# Patient Record
Sex: FEMALE | Race: OTHER | URBAN - METROPOLITAN AREA
[De-identification: names, ages, dates, MRNs, and addresses within clinical notes are randomized per-mention and may not be internally consistent; named-entity substitution may affect disease eponyms.]

---

## 2017-07-31 ENCOUNTER — APPOINTMENT (RX ONLY)
Dept: URBAN - METROPOLITAN AREA CLINIC 76 | Facility: CLINIC | Age: 47
Setting detail: DERMATOLOGY
End: 2017-07-31

## 2017-07-31 VITALS — HEIGHT: 60 IN | WEIGHT: 140 LBS

## 2017-07-31 DIAGNOSIS — L50.3 DERMATOGRAPHIC URTICARIA: ICD-10-CM

## 2017-07-31 DIAGNOSIS — L85.3 XEROSIS CUTIS: ICD-10-CM

## 2017-07-31 PROCEDURE — 99203 OFFICE O/P NEW LOW 30 MIN: CPT

## 2017-07-31 PROCEDURE — ? IN-HOUSE DISPENSING PHARMACY

## 2017-07-31 PROCEDURE — ? TREATMENT REGIMEN

## 2017-07-31 PROCEDURE — ? COUNSELING

## 2017-07-31 ASSESSMENT — LOCATION DETAILED DESCRIPTION DERM
LOCATION DETAILED: LEFT VENTRAL PROXIMAL FOREARM
LOCATION DETAILED: RIGHT VENTRAL PROXIMAL FOREARM
LOCATION DETAILED: RIGHT INFERIOR CENTRAL MALAR CHEEK
LOCATION DETAILED: STERNAL NOTCH

## 2017-07-31 ASSESSMENT — LOCATION SIMPLE DESCRIPTION DERM
LOCATION SIMPLE: LEFT FOREARM
LOCATION SIMPLE: CHEST
LOCATION SIMPLE: RIGHT FOREARM
LOCATION SIMPLE: RIGHT CHEEK

## 2017-07-31 ASSESSMENT — LOCATION ZONE DERM
LOCATION ZONE: TRUNK
LOCATION ZONE: ARM
LOCATION ZONE: FACE

## 2017-07-31 NOTE — PROCEDURE: IN-HOUSE DISPENSING PHARMACY
Product 65 Refills: 0
Product 42 Price/Unit (In Dollars): 40.00
Name Of Product 35: Tacro 0.1% Ointment - 995652
Product 13 Units Dispensed: 1
Name Of Product 12: Iodoquin / Janak Combo - 708731
Product 16 Unit Type: grams
Product 55 Unit Type: mg
Product 31 Amount/Unit (Numbers Only): 30
Product 14 Application Directions: Apply to affected area one to two times a day.
Product 5 Amount/Unit (Numbers Only): 30
Product 12 Refills: 6
Name Of Product 31: Ivermec 1% / Met 1%/ Pot Azel Gel - 912638
Name Of Product 5: Clind / Tret Combo Cream - 715439
Product 4 Application Directions: Apply to affected area before moisturizer one time a day.
Name Of Product 13: Clob 0.05% Cream - 654477
Product 24 Price/Unit (In Dollars): 50.00
Name Of Product 41: Hydroquin 6%/ Tret 0.05% Combo Cream - 827699
Product 11 Unit Type: cc's
Product 11 Amount/Unit (Numbers Only): 60
Name Of Product 1: Sod Sulfa Body Wash - 833178
Product 16 Application Directions: apply to affected area bid
Name Of Product 14: Clob 0.05% Dermatitis Topical Foam - 773866
Product 42 Application Directions: Apply to hyperpigmented area in the evening after moisturizer.
Name Of Product 3: Gian / Clind Combo - 658018
Product 31 Refills: 11
Product 5 Refills: 11
Product 14 Amount/Unit (Numbers Only): 50
Name Of Product 7: Sod Sulf 10% / Sulf 2%  cream- 614402
Send Charges To Patient Encounter: Yes
Name Of Product 42: Kojic Melasma Cream - 690896
Product 12 Application Directions: Apply to affected area two times a day.
Name Of Product 16: Tacro 0.1% ointment-374015
Product 1 Application Directions: Use as face or body wash daily.
Product 15 Amount/Unit (Numbers Only): 60
Name Of Product 4: Acne Gel w/ Dapsone 7.5% - 365110
Product 51 Application Directions: Apply to affected nails daily for 10 months.
Name Of Product 21: Imiquim 5% / Levo 1% Gel - 353808
Product 1 Amount/Unit (Numbers Only): 120
Name Of Product 15: Triam 0.1%/Calcip 0.005% Psoriasis Ointment- 337592
Product 6 Application Directions: Apply to affected area after moisturizer one time a day.
Name Of Product 11: Clob 0.05% Solution - 615010
Product 3 Application Directions: Apply to acne prone area after moisturizer one time a day.
Product 51 Amount/Unit (Numbers Only): 15
Product 21 Application Directions: Apply to affected area in the evening or every other evening.
Name Of Product 24: Triamcin 1% Ointment - 866927
Product 24 Application Directions: Apply to affected area one time a day.
Name Of Product 8: Taza 0.1% Cream - 656240
Name Of Product 51: Terb 5%/ DMSO Anti- Fungal Nail Solution - 219481
Product 5 Application Directions: Apply to affected area in evening after moisturizer. Avoid eyelids.
Product 8 Application Directions: Apply to affected area in the evening after moisturizer.  Avoid eyelids.
Name Of Product 2: Tret 0.05% Cream - 415362
Name Of Product 6: Adap  0.3%/BPO Combo Cream - 332142
Detail Level: Zone
Product 2 Application Directions: Apply to affected area in the evening after moisturizer.  Avoid eyelids.

## 2017-07-31 NOTE — PROCEDURE: TREATMENT REGIMEN
Detail Level: Zone
Plan: After using the pool, rinse off skin and apply Cera ve SA cream\\n\\n\\nDiscussed oral prednisone if not clearing
Continue Regimen: Zyrtec bid

## 2017-11-02 ENCOUNTER — APPOINTMENT (RX ONLY)
Dept: URBAN - METROPOLITAN AREA CLINIC 76 | Facility: CLINIC | Age: 47
Setting detail: DERMATOLOGY
End: 2017-11-02

## 2017-11-02 DIAGNOSIS — L20.89 OTHER ATOPIC DERMATITIS: ICD-10-CM

## 2017-11-02 PROBLEM — L20.84 INTRINSIC (ALLERGIC) ECZEMA: Status: ACTIVE | Noted: 2017-11-02

## 2017-11-02 PROCEDURE — ? PRESCRIPTION

## 2017-11-02 PROCEDURE — ? COUNSELING

## 2017-11-02 PROCEDURE — ? OTHER

## 2017-11-02 PROCEDURE — 99213 OFFICE O/P EST LOW 20 MIN: CPT

## 2017-11-02 RX ORDER — HYDROCORTISONE BUTYRATE 1 MG/G
CREAM TOPICAL
Qty: 1 | Refills: 2 | Status: ERX | COMMUNITY
Start: 2017-11-02

## 2017-11-02 RX ORDER — FLUOCINONIDE 1 MG/G
CREAM TOPICAL
Qty: 1 | Refills: 4 | Status: ERX | COMMUNITY
Start: 2017-11-02

## 2017-11-02 RX ORDER — EMOLLIENT COMBINATION NO.53
CREAM (GRAM) TOPICAL
Qty: 1 | Refills: 3 | Status: ERX | COMMUNITY
Start: 2017-11-02

## 2017-11-02 RX ADMIN — FLUOCINONIDE: 1 CREAM TOPICAL at 17:59

## 2017-11-02 RX ADMIN — HYDROCORTISONE BUTYRATE: 1 CREAM TOPICAL at 17:59

## 2017-11-02 RX ADMIN — Medication: at 17:59

## 2017-11-02 ASSESSMENT — LOCATION SIMPLE DESCRIPTION DERM: LOCATION SIMPLE: LEFT CHEEK

## 2017-11-02 ASSESSMENT — LOCATION DETAILED DESCRIPTION DERM: LOCATION DETAILED: LEFT CENTRAL MALAR CHEEK

## 2017-11-02 ASSESSMENT — LOCATION ZONE DERM: LOCATION ZONE: FACE

## 2017-11-02 NOTE — PROCEDURE: OTHER
Note Text (......Xxx Chief Complaint.): This diagnosis correlates with the
Detail Level: Zone
Other (Free Text): patient informed that there is no \"cure\" for eczema.  she was given dry skin precautions and given scripts for both prevention (HPR) and for eczema on face (hydrocortisone)  and body (fluicinonide)

## 2018-12-04 ENCOUNTER — APPOINTMENT (RX ONLY)
Dept: URBAN - METROPOLITAN AREA CLINIC 48 | Facility: CLINIC | Age: 48
Setting detail: DERMATOLOGY
End: 2018-12-04

## 2018-12-04 DIAGNOSIS — L20.89 OTHER ATOPIC DERMATITIS: ICD-10-CM | Status: WELL CONTROLLED

## 2018-12-04 PROBLEM — L20.84 INTRINSIC (ALLERGIC) ECZEMA: Status: ACTIVE | Noted: 2018-12-04

## 2018-12-04 PROBLEM — J45.909 UNSPECIFIED ASTHMA, UNCOMPLICATED: Status: ACTIVE | Noted: 2018-12-04

## 2018-12-04 PROBLEM — E78.5 HYPERLIPIDEMIA, UNSPECIFIED: Status: ACTIVE | Noted: 2018-12-04

## 2018-12-04 PROCEDURE — ? COUNSELING

## 2018-12-04 PROCEDURE — 99213 OFFICE O/P EST LOW 20 MIN: CPT

## 2018-12-04 PROCEDURE — ? PRESCRIPTION

## 2018-12-04 PROCEDURE — ? TREATMENT REGIMEN

## 2018-12-04 RX ORDER — EMOLLIENT COMBINATION NO.53
CREAM (GRAM) TOPICAL
Qty: 1 | Refills: 9 | Status: ERX | COMMUNITY
Start: 2018-12-04

## 2018-12-04 RX ORDER — HYDROCORTISONE BUTYRATE 1 MG/G
CREAM TOPICAL
Qty: 1 | Refills: 4 | Status: ERX | COMMUNITY
Start: 2018-12-04

## 2018-12-04 RX ORDER — FLUOCINONIDE 0.5 MG/G
CREAM TOPICAL
Qty: 1 | Refills: 4 | Status: ERX | COMMUNITY
Start: 2018-12-04

## 2018-12-04 RX ADMIN — HYDROCORTISONE BUTYRATE: 1 CREAM TOPICAL at 17:33

## 2018-12-04 RX ADMIN — FLUOCINONIDE: 0.5 CREAM TOPICAL at 17:31

## 2018-12-04 RX ADMIN — Medication: at 17:31

## 2018-12-04 ASSESSMENT — LOCATION SIMPLE DESCRIPTION DERM
LOCATION SIMPLE: RIGHT UPPER ARM
LOCATION SIMPLE: LEFT POPLITEAL SKIN
LOCATION SIMPLE: LEFT UPPER ARM
LOCATION SIMPLE: RIGHT POPLITEAL SKIN

## 2018-12-04 ASSESSMENT — SEVERITY ASSESSMENT: SEVERITY: MILD TO MODERATE

## 2018-12-04 ASSESSMENT — LOCATION DETAILED DESCRIPTION DERM
LOCATION DETAILED: RIGHT ANTECUBITAL SKIN
LOCATION DETAILED: LEFT POPLITEAL SKIN
LOCATION DETAILED: LEFT ANTECUBITAL SKIN
LOCATION DETAILED: RIGHT POPLITEAL SKIN

## 2018-12-04 ASSESSMENT — LOCATION ZONE DERM
LOCATION ZONE: LEG
LOCATION ZONE: ARM

## 2018-12-04 NOTE — HPI: RASH
What Type Of Note Output Would You Prefer (Optional)?: Standard Output
Is This A New Presentation, Or A Follow-Up?: Follow Up Rash
Additional History: Patient uses fluocinonide on her body and hydrocortisone on her face when flared for up to 4 days at a time. She is still using HPR.

## 2018-12-04 NOTE — PROCEDURE: TREATMENT REGIMEN
Plan: Patient will continue HPR, fluocinonide on body, hydrocortisone on face only when flared. Can consider prednisone if out of control and consider speaking about Dupixent if necessary but patient feels comfortable in her regimen.
Detail Level: Zone

## 2019-05-09 ENCOUNTER — RX ONLY (OUTPATIENT)
Age: 49
Setting detail: RX ONLY
End: 2019-05-09

## 2019-05-09 RX ORDER — EMOLLIENT COMBINATION NO.53
CREAM (GRAM) TOPICAL
Qty: 1 | Refills: 9 | Status: ERX

## 2019-05-09 RX ORDER — FLUOCINONIDE 0.5 MG/G
CREAM TOPICAL
Qty: 1 | Refills: 4 | Status: ERX

## 2019-05-14 RX ORDER — HYDROCORTISONE BUTYRATE 1 MG/G
CREAM TOPICAL
Qty: 1 | Refills: 4 | Status: ERX

## 2019-05-21 ENCOUNTER — RX ONLY (OUTPATIENT)
Age: 49
Setting detail: RX ONLY
End: 2019-05-21

## 2019-05-21 RX ORDER — EMOLLIENT COMBINATION NO.53
CREAM (GRAM) TOPICAL
Qty: 1 | Refills: 3 | Status: ERX | COMMUNITY
Start: 2019-05-21

## 2019-05-21 RX ORDER — FLUOCINONIDE 0.5 MG/G
CREAM TOPICAL
Qty: 1 | Refills: 4 | Status: ERX

## 2019-09-12 ENCOUNTER — RX ONLY (OUTPATIENT)
Age: 49
Setting detail: RX ONLY
End: 2019-09-12

## 2019-09-12 RX ORDER — EMOLLIENT COMBINATION NO.53
CREAM (GRAM) TOPICAL
Qty: 1 | Refills: 3 | Status: ERX

## 2020-03-30 ENCOUNTER — APPOINTMENT (RX ONLY)
Dept: URBAN - METROPOLITAN AREA CLINIC 48 | Facility: CLINIC | Age: 50
Setting detail: DERMATOLOGY
End: 2020-03-30

## 2020-03-30 DIAGNOSIS — L20.89 OTHER ATOPIC DERMATITIS: ICD-10-CM | Status: WELL CONTROLLED

## 2020-03-30 PROBLEM — L20.84 INTRINSIC (ALLERGIC) ECZEMA: Status: ACTIVE | Noted: 2020-03-30

## 2020-03-30 PROCEDURE — ? TREATMENT REGIMEN

## 2020-03-30 PROCEDURE — ? PRESCRIPTION

## 2020-03-30 PROCEDURE — 99213 OFFICE O/P EST LOW 20 MIN: CPT | Mod: 95

## 2020-03-30 PROCEDURE — ? COUNSELING

## 2020-03-30 RX ORDER — EMOLLIENT COMBINATION NO.53
CREAM (GRAM) TOPICAL
Qty: 1 | Refills: 3 | Status: ERX

## 2020-03-30 RX ORDER — FLUOCINONIDE 0.5 MG/G
CREAM TOPICAL
Qty: 1 | Refills: 4 | Status: ERX

## 2020-03-30 RX ORDER — HYDROCORTISONE BUTYRATE 1 MG/G
CREAM TOPICAL
Qty: 1 | Refills: 4 | Status: ERX

## 2020-03-30 ASSESSMENT — LOCATION DETAILED DESCRIPTION DERM
LOCATION DETAILED: RIGHT ANTECUBITAL SKIN
LOCATION DETAILED: RIGHT POPLITEAL SKIN
LOCATION DETAILED: LEFT POPLITEAL SKIN
LOCATION DETAILED: LEFT INFERIOR CENTRAL MALAR CHEEK
LOCATION DETAILED: LEFT ANTECUBITAL SKIN

## 2020-03-30 ASSESSMENT — SEVERITY ASSESSMENT 2020: SEVERITY 2020: MODERATE

## 2020-03-30 ASSESSMENT — LOCATION ZONE DERM
LOCATION ZONE: ARM
LOCATION ZONE: LEG
LOCATION ZONE: FACE

## 2020-03-30 ASSESSMENT — LOCATION SIMPLE DESCRIPTION DERM
LOCATION SIMPLE: RIGHT POPLITEAL SKIN
LOCATION SIMPLE: LEFT UPPER ARM
LOCATION SIMPLE: LEFT POPLITEAL SKIN
LOCATION SIMPLE: RIGHT UPPER ARM
LOCATION SIMPLE: LEFT CHEEK

## 2020-03-30 ASSESSMENT — BSA RASH: BSA RASH: 9

## 2020-03-30 NOTE — PROCEDURE: TREATMENT REGIMEN
Continue Regimen: HPR cream\\nFluocinonide cream on body\\nHydrocortisone on face
Plan: Ok to refill topicals for one year
Detail Level: Zone